# Patient Record
Sex: FEMALE | Race: BLACK OR AFRICAN AMERICAN | URBAN - METROPOLITAN AREA
[De-identification: names, ages, dates, MRNs, and addresses within clinical notes are randomized per-mention and may not be internally consistent; named-entity substitution may affect disease eponyms.]

---

## 2021-08-09 ENCOUNTER — OFFICE VISIT (OUTPATIENT)
Dept: OTOLARYNGOLOGY | Facility: CLINIC | Age: 61
End: 2021-08-09
Payer: MEDICAID

## 2021-08-09 VITALS — BODY MASS INDEX: 43.82 KG/M2 | WEIGHT: 263 LBS | HEIGHT: 65 IN

## 2021-08-09 DIAGNOSIS — J38.3 POLYPOID DEGENERATION OF VOCAL CORDS: ICD-10-CM

## 2021-08-09 DIAGNOSIS — R06.1 EXPIRATORY STRIDOR: Primary | ICD-10-CM

## 2021-08-09 PROCEDURE — 31575 DIAGNOSTIC LARYNGOSCOPY: CPT | Performed by: SPECIALIST

## 2021-08-09 PROCEDURE — 3008F BODY MASS INDEX DOCD: CPT | Performed by: SPECIALIST

## 2021-08-09 PROCEDURE — 99203 OFFICE O/P NEW LOW 30 MIN: CPT | Performed by: SPECIALIST

## 2021-08-09 RX ORDER — ALBUTEROL SULFATE 90 UG/1
1 AEROSOL, METERED RESPIRATORY (INHALATION) EVERY 4 HOURS PRN
COMMUNITY
Start: 2021-05-07

## 2021-08-09 RX ORDER — MONTELUKAST SODIUM 10 MG/1
TABLET ORAL
COMMUNITY
Start: 2021-07-21

## 2021-08-09 RX ORDER — PANTOPRAZOLE SODIUM 40 MG/1
40 TABLET, DELAYED RELEASE ORAL DAILY
COMMUNITY
Start: 2021-04-05

## 2021-08-09 NOTE — PROGRESS NOTES
Salvador Pickens is a 59year old female. Patient presents with:  Throat Problem: pt c/o frequent choking due to weezing for over a year.  hx covid in nov 2020    HPI:   Dyspnea and wheezing    Current Outpatient Medications   Medication Sig Dispense Memory - Normal. Cranial nerves - Cranial nerves II through XII grossly intact. Nasopharynx Normal by fiberoptic exam   lungs Clear to ascultation   Larynx Consent was obtained for the procedure.   The nose was asesthetized with 1% neosynephrine and 4% li

## 2021-08-09 NOTE — PATIENT INSTRUCTIONS
Try to sleep elevated  Get blood work and neck xray.   I will consider a CPAP titration study if the above 2 are negative